# Patient Record
Sex: FEMALE | Race: WHITE
[De-identification: names, ages, dates, MRNs, and addresses within clinical notes are randomized per-mention and may not be internally consistent; named-entity substitution may affect disease eponyms.]

---

## 2017-11-12 ENCOUNTER — HOSPITAL ENCOUNTER (EMERGENCY)
Dept: HOSPITAL 62 - ER | Age: 37
Discharge: HOME | End: 2017-11-12
Payer: SELF-PAY

## 2017-11-12 VITALS — SYSTOLIC BLOOD PRESSURE: 105 MMHG | DIASTOLIC BLOOD PRESSURE: 60 MMHG

## 2017-11-12 DIAGNOSIS — R19.7: ICD-10-CM

## 2017-11-12 DIAGNOSIS — R10.13: ICD-10-CM

## 2017-11-12 DIAGNOSIS — K29.00: Primary | ICD-10-CM

## 2017-11-12 DIAGNOSIS — K21.9: ICD-10-CM

## 2017-11-12 DIAGNOSIS — F17.200: ICD-10-CM

## 2017-11-12 LAB
ALBUMIN SERPL-MCNC: 4.1 G/DL (ref 3.5–5)
ALP SERPL-CCNC: 44 U/L (ref 38–126)
ALT SERPL-CCNC: 26 U/L (ref 9–52)
ANION GAP SERPL CALC-SCNC: 10 MMOL/L (ref 5–19)
APPEARANCE UR: CLEAR
AST SERPL-CCNC: 19 U/L (ref 14–36)
BASOPHILS # BLD AUTO: 0.1 10^3/UL (ref 0–0.2)
BASOPHILS NFR BLD AUTO: 0.7 % (ref 0–2)
BILIRUB DIRECT SERPL-MCNC: 0.3 MG/DL (ref 0–0.4)
BILIRUB SERPL-MCNC: 0.3 MG/DL (ref 0.2–1.3)
BILIRUB UR QL STRIP: NEGATIVE
BUN SERPL-MCNC: 10 MG/DL (ref 7–20)
CALCIUM: 9.4 MG/DL (ref 8.4–10.2)
CHLORIDE SERPL-SCNC: 108 MMOL/L (ref 98–107)
CK MB SERPL-MCNC: 0.57 NG/ML (ref ?–4.55)
CK SERPL-CCNC: 88 U/L (ref 30–135)
CO2 SERPL-SCNC: 24 MMOL/L (ref 22–30)
CREAT SERPL-MCNC: 0.85 MG/DL (ref 0.52–1.25)
EOSINOPHIL # BLD AUTO: 0.2 10^3/UL (ref 0–0.6)
EOSINOPHIL NFR BLD AUTO: 2.6 % (ref 0–6)
ERYTHROCYTE [DISTWIDTH] IN BLOOD BY AUTOMATED COUNT: 12.9 % (ref 11.5–14)
GLUCOSE SERPL-MCNC: 110 MG/DL (ref 75–110)
GLUCOSE UR STRIP-MCNC: NEGATIVE MG/DL
HCT VFR BLD CALC: 39.6 % (ref 36–47)
HGB BLD-MCNC: 13.2 G/DL (ref 12–15.5)
HGB HCT DIFFERENCE: 0
KETONES UR STRIP-MCNC: NEGATIVE MG/DL
LYMPHOCYTES # BLD AUTO: 3 10^3/UL (ref 0.5–4.7)
LYMPHOCYTES NFR BLD AUTO: 39.8 % (ref 13–45)
MCH RBC QN AUTO: 28.1 PG (ref 27–33.4)
MCHC RBC AUTO-ENTMCNC: 33.4 G/DL (ref 32–36)
MCV RBC AUTO: 84 FL (ref 80–97)
MONOCYTES # BLD AUTO: 0.4 10^3/UL (ref 0.1–1.4)
MONOCYTES NFR BLD AUTO: 5.1 % (ref 3–13)
NEUTROPHILS # BLD AUTO: 3.9 10^3/UL (ref 1.7–8.2)
NEUTS SEG NFR BLD AUTO: 51.8 % (ref 42–78)
NITRITE UR QL STRIP: NEGATIVE
PH UR STRIP: 5 [PH] (ref 5–9)
POTASSIUM SERPL-SCNC: 4.5 MMOL/L (ref 3.6–5)
PROT SERPL-MCNC: 6.7 G/DL (ref 6.3–8.2)
PROT UR STRIP-MCNC: NEGATIVE MG/DL
RBC # BLD AUTO: 4.71 10^6/UL (ref 3.72–5.28)
SODIUM SERPL-SCNC: 141.7 MMOL/L (ref 137–145)
SP GR UR STRIP: 1.01
TROPONIN I SERPL-MCNC: < 0.012 NG/ML
UROBILINOGEN UR-MCNC: NEGATIVE MG/DL (ref ?–2)
WBC # BLD AUTO: 7.5 10^3/UL (ref 4–10.5)

## 2017-11-12 PROCEDURE — 82550 ASSAY OF CK (CPK): CPT

## 2017-11-12 PROCEDURE — 80053 COMPREHEN METABOLIC PANEL: CPT

## 2017-11-12 PROCEDURE — 93005 ELECTROCARDIOGRAM TRACING: CPT

## 2017-11-12 PROCEDURE — 93010 ELECTROCARDIOGRAM REPORT: CPT

## 2017-11-12 PROCEDURE — 71010: CPT

## 2017-11-12 PROCEDURE — 84484 ASSAY OF TROPONIN QUANT: CPT

## 2017-11-12 PROCEDURE — 96374 THER/PROPH/DIAG INJ IV PUSH: CPT

## 2017-11-12 PROCEDURE — 99283 EMERGENCY DEPT VISIT LOW MDM: CPT

## 2017-11-12 PROCEDURE — 85025 COMPLETE CBC W/AUTO DIFF WBC: CPT

## 2017-11-12 PROCEDURE — 82553 CREATINE MB FRACTION: CPT

## 2017-11-12 PROCEDURE — 81025 URINE PREGNANCY TEST: CPT

## 2017-11-12 PROCEDURE — 36415 COLL VENOUS BLD VENIPUNCTURE: CPT

## 2017-11-12 PROCEDURE — S0028 INJECTION, FAMOTIDINE, 20 MG: HCPCS

## 2017-11-12 PROCEDURE — 81001 URINALYSIS AUTO W/SCOPE: CPT

## 2017-11-12 NOTE — RADIOLOGY REPORT (SQ)
EXAM DESCRIPTION:  CHEST SINGLE VIEW



COMPLETED DATE/TIME:  11/12/2017 7:47 pm



REASON FOR STUDY:  epigastric pain



COMPARISON:  2011.



NUMBER OF VIEWS:  One view.



TECHNIQUE:  Single frontal radiographic view of the chest acquired.



LIMITATIONS:  None.



FINDINGS:  LUNGS AND PLEURA: No opacities, masses or pneumothorax. No pleural effusion.

MEDIASTINUM AND HILAR STRUCTURES: No masses.  Contour normal.

HEART AND VASCULAR STRUCTURES: Heart normal in size.  Normal vasculature.

BONES: No acute findings.

HARDWARE: None in the chest.

OTHER: No other significant finding.



IMPRESSION:  NO SIGNIFICANT RADIOGRAPHIC FINDING IN THE CHEST.



TECHNICAL DOCUMENTATION:  JOB ID:  [id number]

 2011 [employer]- All Rights Reserved

## 2017-11-12 NOTE — ER DOCUMENT REPORT
ED General





- General


Chief Complaint: Loose Stools


Stated Complaint: HEARTBURN


Time Seen by Provider: 11/12/17 19:10


TRAVEL OUTSIDE OF THE [place] IN LAST 30 DAYS: No





- HPI


Notes: 





Patient is a 37-year-old female with a history of acid reflux who presents the 

ED complaining of GERD symptoms over the last 30 days, loose stool, and 

epigastric pain over the last 2 days.  Patient states that she has been using 

over-the-counter meds with minimal relief.  Patient states that food intake 

worsens her symptoms and laying supine worsens her symptoms.  Patient is able 

to ambulate without any worsening symptoms or development of dyspnea on 

exertion.  Patient states that she has not been evaluated by gastroenterology 

nor has she had an endoscopy performed in the past.  Patient has not noticed 

any melena, hematochezia.  Patient states that otherwise she is still eating 

and drinking and urinating normally.  Patient does admit to smoking but denies 

any other IV drug use.  Patient is currently taking birth control.  She denies 

any long distance travel, leg pains, hemoptysis, or other significant medical 

history.  Pt denies any cardiac family history.  She denies any history of MI, 

PE, DVT, CA, or DM.  Denies any headache, fever, neck pain, URI, sore throat, 

chest pain, palpitations, syncope, cough, shortness of breath, wheeze, dyspnea, 

nausea/vomiting/diarrhea, urinary retention, dysuria, hematuria, back pain, 

loss of control of bowel or bladder, numbness/tingling, muscle paralysis/

weakness, or rash.  Denies drug allergies.





- Related Data


Allergies/Adverse Reactions: 


 





No Known Allergies Allergy (Verified 11/12/17 18:21)


 











Past Medical History





- Social History


Smoking Status: Current Every Day Smoker


Family History: Reviewed & Not Pertinent


Patient has suicidal ideation: No


Patient has homicidal ideation: No





- Past Medical History


Cardiac Medical History: 


   Denies: Hx Coronary Artery Disease, Hx Heart Attack, Hx Hypertension


Pulmonary Medical History: 


   Denies: Hx Asthma, Hx Bronchitis, Hx COPD, Hx Pneumonia


Neurological Medical History: Denies: Hx Cerebrovascular Accident, Hx Seizures


Renal/ Medical History: Reports: Hx Ovarian Cysts.  Denies: Hx Peritoneal 

Dialysis


Musculoskeltal Medical History: Denies Hx Arthritis





- Immunizations


Hx Diphtheria, Pertussis, Tetanus Vaccination: Yes





Review of Systems





- Review of Systems


Notes: 





REVIEW OF SYSTEMS:


CONSTITUTIONAL :  Denies fever,  chills, or sweats.  Denies recent illness.


EENT:   Denies eye, ear, throat, or mouth pain or symptoms.  Denies nasal or 

sinus congestion or discharge.  Denies throat, tongue, or mouth swelling or 

difficulty swallowing.


CARDIOVASCULAR:  Denies chest pain.  Denies palpitations or racing or irregular 

heart beat.  Denies ankle edema.


RESPIRATORY:  Denies cough, cold, or chest congestion.  Denies shortness of 

breath, difficulty breathing, or wheezing.


GASTROINTESTINAL: see hpi.  


GENITOURINARY:  Denies difficulty urinating, painful urination, burning, 

frequency, blood in urine, or discharge.


MUSCULOSKELETAL:  Denies back or neck pain or stiffness.  Denies joint pain or 

swelling.


SKIN:   Denies rash, lesions or sores.


NEUROLOGICAL:  Denies confusion or altered mental status.  Denies passing out 

or loss of consciousness.  Denies dizziness or lightheadedness.  Denies 

headache.  Denies weakness or paralysis or loss of use of either side.  Denies 

problems with gait or speech.  Denies sensory loss, numbness, or tingling.  





ALL OTHER SYSTEMS REVIEWED AND NEGATIVE.





Dictation was performed using Dragon voice recognition software





Physical Exam





- Vital signs


Vitals: 


 











Temp Pulse Resp BP Pulse Ox


 


 98.0 F   65   14   103/61   99 


 


 11/12/17 18:21  11/12/17 18:21  11/12/17 18:21  11/12/17 18:21  11/12/17 18:21











Notes: 





PHYSICAL EXAMINATION:





GENERAL: Well-appearing, well-nourished and in no acute distress.  A&Ox4





HEAD: Atraumatic, normocephalic.





EYES: Pupils equal round and reactive to light, extraocular movements intact, 

sclera anicteric, conjunctiva are normal.





ENT: Nares patent and without discharge.  oropharynx clear without exudates.  

No tonsilar hypertrophy or erythema.  Moist mucous membranes.  No sinus 

tenderness.





NECK: Normal range of motion, supple without lymphadenopathy.  No rigidity/

meningismus.





LUNGS: Breath sounds clear to auscultation bilaterally and equal.  No wheezes 

rales or rhonchi.





HEART: Regular rate and rhythm without murmurs, rubs, gallops.





ABDOMEN: Soft, nondistended abdomen.  No guarding, no rebound.  No masses 

appreciated.  Normal bowel sounds present.  No CVA tenderness bilaterally.  + 

epigastric tenderness (tenderness elicited corresponds to pain described).  





Musculoskeletal: FROM to passive/active. Strength 5+/5. 





Extremities:  No cyanosis, clubbing, or edema b/l.  Peripheral pulses 2+.  

Capillary refill less than 3 seconds.





NEUROLOGICAL: Cranial nerves grossly intact.  Normal speech, normal gait.  

Normal sensory, motor exams 





PSYCH: Normal mood, normal affect.





SKIN: Warm, Dry, normal turgor, no rashes or lesions noted.





Course





- Re-evaluation


Re-evalutation: 





11/12/17 21:37


Patient is an afebrile, well-hydrated, 37-year-old female who presents the ED 

with symptoms of acid reflux versus gastritis.  Vitals are stable.  PE is 

otherwise unremarkable at this time.  Well's Score of 0. Heart Score of 1.  EKG 

was unremarkable for any acute pathology.  CBC was also unremarkable for any 

acute pathology.  CMP unremarkable along with cardiac enzymes.  HCG neg. UA 

negative.  GI cocktail was given today along with IV Pepcid 20 mg.  Pt had 

complete resolution of symptoms soon after drinking the GI cocktail.  Low 

suspicion for any ACS, PE, pneumothorax, pericarditis, dissection, sepsis, 

meningitis, acute abdomen, or other systemic emergent condition at this time.  

Patient is aware that her condition can change from initial presentation and 

she needs to monitor symptoms closely and seek medical attention with any acute 

changes.  With her low risk factors and strong suspicion for GI etiology we 

will be able to discharge in stable condition.  I will send her home with a 

prescription for omeprazole and Carafate to take as directed.  Conservative 

measures for symptoms otherwise.  Advised recheck with PCM this week.  Call 

gastroenterology to schedule an appointment for further evaluation and 

management as well.  Return to the ED with any worsening/concerning symptoms 

otherwise as reviewed in discharge.  Patient is in agreement.








- Vital Signs


Vital signs: 


 











Temp Pulse Resp BP Pulse Ox


 


 98.0 F   65   14   103/61   99 


 


 11/12/17 18:21  11/12/17 18:21  11/12/17 18:21  11/12/17 18:21  11/12/17 18:21














- Laboratory


Result Diagrams: 


 11/12/17 20:00





 11/12/17 20:00


Laboratory results interpreted by me: 


 











  11/12/17





  20:00


 


Chloride  108 H














Discharge





- Discharge


Clinical Impression: 


GERD (gastroesophageal reflux disease)


Qualifiers:


 Esophagitis presence: esophagitis presence not specified Qualified Code(s): 

K21.9 - Gastro-esophageal reflux disease without esophagitis





Gastritis


Qualifiers:


 Gastritis type: unspecified gastritis Chronicity: acute Gastritis bleeding: 

without bleeding Qualified Code(s): K29.00 - Acute gastritis without bleeding





Condition: Stable


Disposition: HOME, SELF-CARE


Instructions:  Gastritis ([facility]), Prilosec (Acid Pump Inhibitor) ([facility]), Reflux 

Disease (GERD) ([facility])


Additional Instructions: 


Maintain adequate fluid and food intake


[place] diet ([employer] Bananas, rice, apples, toast, etc if any nausea/vomiting


Take omeprazole and carafate as directed


Monitor for any worsening symptoms


Recheck with your PCM in 3-5 days


Call Gastroenterology tomorrow to schedule an appointment for further 

evaluation and management


Return to the ED with any worsening symptoms and/or development of fever, 

headache, chest pain, palpitations, syncope, shortness of breath, trouble 

breathing, abdominal pain, n/v/d, blood in stool/urine, weakness, or other 

worsening symptoms that are concerning to you.  


Prescriptions: 


Omeprazole 20 mg PO DAILY #30 tablet.


Sucralfate [Carafate] 1 gm PO QID PRN #420 ml


 PRN Reason: 


Referrals: 


SHREYAS RODAS MD [ACTIVE STAFF] - Follow up in 3-5 days

## 2018-03-23 ENCOUNTER — HOSPITAL ENCOUNTER (EMERGENCY)
Dept: HOSPITAL 62 - ER | Age: 38
Discharge: HOME | End: 2018-03-23
Payer: SELF-PAY

## 2018-03-23 VITALS — SYSTOLIC BLOOD PRESSURE: 120 MMHG | DIASTOLIC BLOOD PRESSURE: 64 MMHG

## 2018-03-23 DIAGNOSIS — O20.0: Primary | ICD-10-CM

## 2018-03-23 DIAGNOSIS — Z3A.00: ICD-10-CM

## 2018-03-23 LAB
ADD MANUAL DIFF: NO
ALBUMIN SERPL-MCNC: 4.6 G/DL (ref 3.5–5)
ALP SERPL-CCNC: 55 U/L (ref 38–126)
ALT SERPL-CCNC: 34 U/L (ref 9–52)
ANION GAP SERPL CALC-SCNC: 9 MMOL/L (ref 5–19)
AST SERPL-CCNC: 21 U/L (ref 14–36)
BASOPHILS # BLD AUTO: 0.1 10^3/UL (ref 0–0.2)
BASOPHILS NFR BLD AUTO: 0.7 % (ref 0–2)
BILIRUB DIRECT SERPL-MCNC: 0.3 MG/DL (ref 0–0.4)
BILIRUB SERPL-MCNC: 0.3 MG/DL (ref 0.2–1.3)
BUN SERPL-MCNC: 11 MG/DL (ref 7–20)
CALCIUM: 10 MG/DL (ref 8.4–10.2)
CHLORIDE SERPL-SCNC: 110 MMOL/L (ref 98–107)
CO2 SERPL-SCNC: 23 MMOL/L (ref 22–30)
EOSINOPHIL # BLD AUTO: 0.2 10^3/UL (ref 0–0.6)
EOSINOPHIL NFR BLD AUTO: 2 % (ref 0–6)
ERYTHROCYTE [DISTWIDTH] IN BLOOD BY AUTOMATED COUNT: 13.5 % (ref 11.5–14)
GLUCOSE SERPL-MCNC: 83 MG/DL (ref 75–110)
HCT VFR BLD CALC: 39.7 % (ref 36–47)
HGB BLD-MCNC: 13.2 G/DL (ref 12–15.5)
LYMPHOCYTES # BLD AUTO: 3.1 10^3/UL (ref 0.5–4.7)
LYMPHOCYTES NFR BLD AUTO: 33.4 % (ref 13–45)
MCH RBC QN AUTO: 27.9 PG (ref 27–33.4)
MCHC RBC AUTO-ENTMCNC: 33.3 G/DL (ref 32–36)
MCV RBC AUTO: 84 FL (ref 80–97)
MONOCYTES # BLD AUTO: 0.4 10^3/UL (ref 0.1–1.4)
MONOCYTES NFR BLD AUTO: 4.1 % (ref 3–13)
NEUTROPHILS # BLD AUTO: 5.6 10^3/UL (ref 1.7–8.2)
NEUTS SEG NFR BLD AUTO: 59.8 % (ref 42–78)
PLATELET # BLD: 203 10^3/UL (ref 150–450)
POTASSIUM SERPL-SCNC: 3.8 MMOL/L (ref 3.6–5)
PROT SERPL-MCNC: 7.5 G/DL (ref 6.3–8.2)
RBC # BLD AUTO: 4.73 10^6/UL (ref 3.72–5.28)
SODIUM SERPL-SCNC: 141.5 MMOL/L (ref 137–145)
TOTAL CELLS COUNTED % (AUTO): 100 %
WBC # BLD AUTO: 9.3 10^3/UL (ref 4–10.5)

## 2018-03-23 PROCEDURE — 76817 TRANSVAGINAL US OBSTETRIC: CPT

## 2018-03-23 PROCEDURE — 84702 CHORIONIC GONADOTROPIN TEST: CPT

## 2018-03-23 PROCEDURE — 81025 URINE PREGNANCY TEST: CPT

## 2018-03-23 PROCEDURE — 85025 COMPLETE CBC W/AUTO DIFF WBC: CPT

## 2018-03-23 PROCEDURE — 80053 COMPREHEN METABOLIC PANEL: CPT

## 2018-03-23 PROCEDURE — 99284 EMERGENCY DEPT VISIT MOD MDM: CPT

## 2018-03-23 PROCEDURE — 36415 COLL VENOUS BLD VENIPUNCTURE: CPT

## 2018-03-23 NOTE — ER DOCUMENT REPORT
ED General





- General


Chief Complaint: Vag Bleeding, +preg <12wks


Stated Complaint: VAGINAL BLEEDING


Time Seen by Provider: 03/23/18 15:03


Mode of Arrival: Ambulatory


Information source: Patient


TRAVEL OUTSIDE OF THE [place] IN LAST 30 DAYS: No





- Related Data


Allergies/Adverse Reactions: 


 





No Known Allergies Allergy (Verified 11/12/17 18:21)


 











Past Medical History





- Social History


Smoking Status: Unknown if Ever Smoked


Family History: Reviewed & Not Pertinent


Patient has suicidal ideation: No


Patient has homicidal ideation: No





- Past Medical History


Cardiac Medical History: 


   Denies: Hx Coronary Artery Disease, Hx Heart Attack, Hx Hypertension


Pulmonary Medical History: 


   Denies: Hx Asthma, Hx Bronchitis, Hx COPD, Hx Pneumonia


Neurological Medical History: Denies: Hx Cerebrovascular Accident, Hx Seizures


Renal/ Medical History: Reports: Hx Ovarian Cysts.  Denies: Hx Peritoneal 

Dialysis


Musculoskeltal Medical History: Denies Hx Arthritis





- Immunizations


Hx Diphtheria, Pertussis, Tetanus Vaccination: Yes





Physical Exam





- Vital signs


Vitals: 





 











Temp Pulse Resp BP Pulse Ox


 


 97.8 F   79   20   120/67   99 


 


 03/23/18 14:11  03/23/18 14:11  03/23/18 14:11  03/23/18 14:11  03/23/18 14:11














Course





- Vital Signs


Vital signs: 





 











Temp Pulse Resp BP Pulse Ox


 


 97.8 F   79   20   120/67   99 


 


 03/23/18 14:11  03/23/18 14:11  03/23/18 14:11  03/23/18 14:11  03/23/18 14:11














- Laboratory


Result Diagrams: 


 03/23/18 15:22





 03/23/18 15:22


Laboratory results interpreted by me: 





 











  03/23/18 03/23/18





  15:13 15:22


 


Chloride   110 H


 


Beta HCG, Quant   117.24 H


 


Urine HCG, Qual  POSITIVE H 














Discharge





- Discharge


Clinical Impression: 


 Threatened miscarriage in early pregnancy





Condition: Stable


Disposition: HOME, SELF-CARE


Instructions:  Threatened Miscarriage ([facility])


Additional Instructions: 


Please follow-up in 48 hours for reevaluation of your blood work return 

immediately if there are any other concerns


Forms:  Follow-Up Laboratory Testing

## 2018-03-23 NOTE — RADIOLOGY REPORT (SQ)
EXAM DESCRIPTION:  U/S OB TRANSVAGINAL W/O DOP



COMPLETED DATE/TIME:  3/23/2018 5:31 pm



REASON FOR STUDY:  + preg, vag bleed



COMPARISON:  None.



TECHNIQUE:  Transvaginal static and realtime grayscale images acquired of the pelvis. Additional lucrecia
cted spectral and color Doppler images recorded. All images stored on PACs.

bHC



LIMITATIONS:  None.



FINDINGS:  No identifiable intrauterine gestational sac is seen.

UTERUS: No masses or anomalies.  7 x 4.4 x 3.4 cm.

CERVICAL LENGTH: 3.2 cm.   Closed.

RIGHT ADNEXA: 2 x 2.1 x 3.4 cm.  There is a complex area in the right ovary measuring 1.6 x 1.3 x 1.3
 cm.

No adnexal free fluid.

No adnexal masses.

LEFT ADNEXA: Normal ovary.  2.7 x 2 x 1.9 cm.

No adnexal free fluid.

No adnexal masses.

FREE FLUID: None.

OTHER: No other significant finding.



IMPRESSION:  1.  There is no identifiable intrauterine gestation at this time.

2.  There is small complex area in the right ovary possibly representing a collapsed cyst.

3.  Follow-up as clinically indicated.



TECHNICAL DOCUMENTATION:  JOB ID:  [id number]

  [employer]- All Rights Reserved



Reading location - IP/workstation name: [email or web address]

## 2018-03-25 ENCOUNTER — HOSPITAL ENCOUNTER (OUTPATIENT)
Dept: HOSPITAL 62 - LAB | Age: 38
End: 2018-03-25
Attending: EMERGENCY MEDICINE
Payer: SELF-PAY

## 2018-03-25 DIAGNOSIS — O20.0: Primary | ICD-10-CM

## 2018-03-25 PROCEDURE — 84702 CHORIONIC GONADOTROPIN TEST: CPT

## 2018-03-25 PROCEDURE — 36415 COLL VENOUS BLD VENIPUNCTURE: CPT

## 2018-04-05 ENCOUNTER — HOSPITAL ENCOUNTER (OUTPATIENT)
Dept: HOSPITAL 62 - OD | Age: 38
End: 2018-04-05
Attending: OBSTETRICS & GYNECOLOGY
Payer: SELF-PAY

## 2018-04-05 DIAGNOSIS — O00.90: Primary | ICD-10-CM

## 2018-04-05 LAB
ADD MANUAL DIFF: NO
ALBUMIN SERPL-MCNC: 4.6 G/DL (ref 3.5–5)
ALP SERPL-CCNC: 57 U/L (ref 38–126)
ALT SERPL-CCNC: 30 U/L (ref 9–52)
ANION GAP SERPL CALC-SCNC: 9 MMOL/L (ref 5–19)
AST SERPL-CCNC: 24 U/L (ref 14–36)
BASOPHILS # BLD AUTO: 0.1 10^3/UL (ref 0–0.2)
BASOPHILS NFR BLD AUTO: 0.8 % (ref 0–2)
BILIRUB DIRECT SERPL-MCNC: 0.3 MG/DL (ref 0–0.4)
BILIRUB SERPL-MCNC: 0.4 MG/DL (ref 0.2–1.3)
BUN SERPL-MCNC: 8 MG/DL (ref 7–20)
CALCIUM: 10.2 MG/DL (ref 8.4–10.2)
CHLORIDE SERPL-SCNC: 106 MMOL/L (ref 98–107)
CO2 SERPL-SCNC: 26 MMOL/L (ref 22–30)
EOSINOPHIL # BLD AUTO: 0.2 10^3/UL (ref 0–0.6)
EOSINOPHIL NFR BLD AUTO: 2.4 % (ref 0–6)
ERYTHROCYTE [DISTWIDTH] IN BLOOD BY AUTOMATED COUNT: 13.4 % (ref 11.5–14)
GLUCOSE SERPL-MCNC: 84 MG/DL (ref 75–110)
HCT VFR BLD CALC: 38 % (ref 36–47)
HGB BLD-MCNC: 12.6 G/DL (ref 12–15.5)
LYMPHOCYTES # BLD AUTO: 3.2 10^3/UL (ref 0.5–4.7)
LYMPHOCYTES NFR BLD AUTO: 48.6 % (ref 13–45)
MCH RBC QN AUTO: 27.8 PG (ref 27–33.4)
MCHC RBC AUTO-ENTMCNC: 33.3 G/DL (ref 32–36)
MCV RBC AUTO: 84 FL (ref 80–97)
MONOCYTES # BLD AUTO: 0.3 10^3/UL (ref 0.1–1.4)
MONOCYTES NFR BLD AUTO: 4.6 % (ref 3–13)
NEUTROPHILS # BLD AUTO: 2.9 10^3/UL (ref 1.7–8.2)
NEUTS SEG NFR BLD AUTO: 43.6 % (ref 42–78)
PLATELET # BLD: 239 10^3/UL (ref 150–450)
POTASSIUM SERPL-SCNC: 4.2 MMOL/L (ref 3.6–5)
PROT SERPL-MCNC: 7.6 G/DL (ref 6.3–8.2)
RBC # BLD AUTO: 4.54 10^6/UL (ref 3.72–5.28)
SODIUM SERPL-SCNC: 141.2 MMOL/L (ref 137–145)
TOTAL CELLS COUNTED % (AUTO): 100 %
WBC # BLD AUTO: 6.6 10^3/UL (ref 4–10.5)

## 2018-04-05 PROCEDURE — 80053 COMPREHEN METABOLIC PANEL: CPT

## 2018-04-05 PROCEDURE — 85025 COMPLETE CBC W/AUTO DIFF WBC: CPT

## 2018-04-05 PROCEDURE — 36415 COLL VENOUS BLD VENIPUNCTURE: CPT

## 2018-04-05 PROCEDURE — 84702 CHORIONIC GONADOTROPIN TEST: CPT

## 2019-02-04 ENCOUNTER — HOSPITAL ENCOUNTER (OUTPATIENT)
Dept: HOSPITAL 62 - END | Age: 39
Discharge: HOME | End: 2019-02-04
Attending: INTERNAL MEDICINE
Payer: COMMERCIAL

## 2019-02-04 VITALS — SYSTOLIC BLOOD PRESSURE: 107 MMHG | DIASTOLIC BLOOD PRESSURE: 67 MMHG

## 2019-02-04 DIAGNOSIS — K21.0: Primary | ICD-10-CM

## 2019-02-04 DIAGNOSIS — F17.210: ICD-10-CM

## 2019-02-04 DIAGNOSIS — K44.9: ICD-10-CM

## 2019-02-04 DIAGNOSIS — K29.80: ICD-10-CM

## 2019-02-04 DIAGNOSIS — K29.50: ICD-10-CM

## 2019-02-04 PROCEDURE — 88305 TISSUE EXAM BY PATHOLOGIST: CPT

## 2019-02-04 PROCEDURE — 88342 IMHCHEM/IMCYTCHM 1ST ANTB: CPT

## 2019-02-04 PROCEDURE — 43239 EGD BIOPSY SINGLE/MULTIPLE: CPT

## 2019-02-04 NOTE — OPERATIVE REPORT
Operative Report


DATE OF SURGERY: 02/04/19


Operative Report: 





The risks benefits and alternatives of the procedure explained to the patient in

detail and informed consent is obtained.A GIF Olympus video scope was inserted 

into the patient's mouth and hypopharynx, the esophagus is identified intubated 

and insufflated, the scope was then advanced through the esophagus stomach and 

duodenum, retroflexion maneuver is done, the esophagus stomach and first and 

second portions of the duodenum examined.


PREOPERATIVE DIAGNOSIS: Gastroesophageal reflux disease


POSTOPERATIVE DIAGNOSIS: Mullen's esophagus status post biopsy to rule out 

dysplasia.  Esophagitis.  Hiatal hernia.  Gastritis status post biopsy rule out 

Helicobacter pylori.  Duodenitis


OPERATION: EGD with biopsy


SURGEON: SHREYAS RODAS


ANESTHESIA: LMAC


TISSUE REMOVED OR ALTERED: As noted above.


COMPLICATIONS: 





None.


ESTIMATED BLOOD LOSS: None.


INTRAOPERATIVE FINDINGS: As noted above.


PROCEDURE: 





Patient tolerated the procedure well.


No immediate postprocedure complications are noted.


Patient discharged in good condition.


Discharge date 2/4/2019.


Discharge diet: Regular.


Discharge activity: Regular.


2-3-week follow-up to discuss findings.


Patient is instructed to call the office or proceed to the emergency room should

there be any further problems or questions.


I will wait to pathology.

## 2019-02-12 ENCOUNTER — HOSPITAL ENCOUNTER (OUTPATIENT)
Dept: HOSPITAL 62 - RAD | Age: 39
End: 2019-02-12
Attending: INTERNAL MEDICINE
Payer: COMMERCIAL

## 2019-02-12 DIAGNOSIS — R10.11: ICD-10-CM

## 2019-02-12 DIAGNOSIS — R11.0: Primary | ICD-10-CM

## 2019-02-12 PROCEDURE — 78226 HEPATOBILIARY SYSTEM IMAGING: CPT

## 2019-02-12 PROCEDURE — A9537 TC99M MEBROFENIN: HCPCS

## 2019-02-12 PROCEDURE — 76705 ECHO EXAM OF ABDOMEN: CPT

## 2019-02-12 NOTE — RADIOLOGY REPORT (SQ)
EXAM DESCRIPTION:  NM HIDA SCAN



COMPLETED DATE/TIME:  2/12/2019 1:37 pm



REASON FOR STUDY:  NAUSEA (R11.0), RUQ PAIN (R10.11)



COMPARISON:  None.



RADIONUCLIDE AND DOSE:  5.7 mCi technetium 99 mebrofenin.  Intravenous.



ADDITIONAL DRUGS AND DOSES:  None.



TECHNIQUE:  Dynamic images obtained over the gallbladder fossa.  Equipment malfunction prohibited pro
per completion of the study.



LIMITATIONS:  None.



FINDINGS:  Gallbladder activity.



IMPRESSION:  Nondiagnostic.



TECHNICAL DOCUMENTATION:  JOB ID:  [id number]

 2011 [employer]- All Rights Reserved



Reading location - IP/workstation name: [email or web address]

## 2019-02-12 NOTE — RADIOLOGY REPORT (SQ)
EXAM DESCRIPTION:  U/S ABDOMEN LIMITED W/O DOP



COMPLETED DATE/TIME:  2/12/2019 7:58 am



REASON FOR STUDY:  NAUSEA (R11.0), RUQ PAIN (R10.11) R11.0  NAUSEA R10.11  RIGHT UPPER QUADRANT PAIN



COMPARISON:  None.



TECHNIQUE:  Dynamic and static grayscale images acquired of the abdomen and recorded on PACS. Additio
nal selected color Doppler and spectral images recorded.



LIMITATIONS:  None.



FINDINGS:  PANCREAS:  The head and body of the pancreas are of normal echogenicity.  The tail is subo
ptimally visualized due to overlying bowel gas.

LIVER:  The liver measures 14.9 cm in length, normal size.  No masses. Echotexture normal.

LIVER VASCULATURE: Normal directional flow of the main portal vein and hepatic veins.

GALLBLADDER: No stones.  The gallbladder wall measures 1.9 mm, normal wall thickness. No pericholecys
tic fluid.

ULTRASOUND-DETECTED BRYAN'S SIGN: Negative.

INTRAHEPATIC DUCTS AND COMMON DUCT: CBD measures 3.1 mm in diameter, normal.  The intrahepatic ducts 
normal caliber. No filling defects.

INFERIOR VENA CAVA: Normal flow.

AORTA: No aneurysm.

RIGHT KIDNEY:  The right kidney measures 10.8 cm in length, normal size.  Normal echogenicity. No sol
id or suspicious masses. No hydronephrosis. No calcifications.

PERITONEAL AND RIGHT PLEURAL SPACE: No ascites or effusions.

OTHER: No other significant findings.



IMPRESSION:  1.  The tail of the pancreas is suboptimally visualized due to overlying bowel gas.

2.  Examination is otherwise unremarkable.



TECHNICAL DOCUMENTATION:  JOB ID:  [id number]

 2011 [employer]- All Rights Reserved



Reading location - IP/workstation name: [email or web address]

## 2019-02-19 ENCOUNTER — HOSPITAL ENCOUNTER (OUTPATIENT)
Dept: HOSPITAL 62 - OROUT | Age: 39
Discharge: HOME | End: 2019-02-19
Attending: INTERNAL MEDICINE
Payer: COMMERCIAL

## 2019-02-19 VITALS — SYSTOLIC BLOOD PRESSURE: 135 MMHG | DIASTOLIC BLOOD PRESSURE: 85 MMHG

## 2019-02-19 DIAGNOSIS — K22.719: Primary | ICD-10-CM

## 2019-02-19 DIAGNOSIS — F17.210: ICD-10-CM

## 2019-02-19 DIAGNOSIS — K64.8: ICD-10-CM

## 2019-02-19 DIAGNOSIS — K60.2: ICD-10-CM

## 2019-02-19 DIAGNOSIS — K52.9: ICD-10-CM

## 2019-02-19 DIAGNOSIS — Z79.899: ICD-10-CM

## 2019-02-19 PROCEDURE — 88305 TISSUE EXAM BY PATHOLOGIST: CPT

## 2019-02-19 PROCEDURE — 81025 URINE PREGNANCY TEST: CPT

## 2019-02-19 PROCEDURE — 45380 COLONOSCOPY AND BIOPSY: CPT

## 2019-02-19 PROCEDURE — 43270 EGD LESION ABLATION: CPT

## 2019-02-19 NOTE — OPERATIVE REPORT
Operative Report


DATE OF SURGERY: 02/19/19


Operative Report: 





The risks, benefits and alternatives of the procedure including the risk of 

bleeding, perforation requiring surgery have been explained to the patient in 

detail and informed consent has been obtained.  The patient is placed in a left,

lateral decubital position.  Timeout was called.  Propofol medication is 

administered.  Rectal examination is done which did not reveal any masses, tears

or fissures.  An Olympus videoscope was introduced into the patient's rectum.  

The scope was then carefully advanced all the way to the cecum.  The cecum was 

identified by the usual anatomical landmarks including the ileocecal valve as 

well as the appendiceal office.  Photodocumentation was obtained.  The scope was

then sequentially pulled back via the various segments of the colon including 

the ascending colon, hepatic flexure, transverse colon, splenic flexure, de

scending colon and finally into the rectosigmoid portions of the colon.  

Retroflexion maneuver was performed.


The risks benefits and alternatives of the procedure explained to the patient in

detail and informed consent is obtained.A GIF Olympus video scope was inserted 

into the patient's mouth and hypopharynx ,the esophagus is identified intubated 

and insufflated ,the scope was then advanced through the esophagus stomach and 

duodenum ,retroflexion maneuver is done, the esophagus stomach and first and 

second portions of the duodenum examined.


PREOPERATIVE DIAGNOSIS: Rectal pain.  History of Mullen's esophagus with 

unknown dysplasia


POSTOPERATIVE DIAGNOSIS: Inflammation noted on the right side of the colon 

status post biopsy.  Anal fissure noted.  Internal hemorrhoids.  Mullen's 

esophagus radiofrequency ablation


OPERATION: Colonoscopy with biopsy.  EGD with ablation


SURGEON: SHREYAS RODAS


ANESTHESIA: LMAC


TISSUE REMOVED OR ALTERED: As noted above.


COMPLICATIONS: 





None.


ESTIMATED BLOOD LOSS: None.


INTRAOPERATIVE FINDINGS: As noted above.


PROCEDURE: 





Patient tolerated the procedure well.


No immediate postprocedure complications are noted.


Patient discharged in good condition.


Discharge date 2/19/2019.


Discharge diet: Regular.


Discharge activity: Regular.


2-3-week follow-up to discuss findings.


Patient is instructed call the office or proceed to the emergency room should 

there be any further proximal questions.


Wait on the pathology.

## 2023-09-24 ENCOUNTER — WEB ENCOUNTER (OUTPATIENT)
Dept: URBAN - METROPOLITAN AREA CLINIC 54 | Facility: CLINIC | Age: 43
End: 2023-09-24

## 2023-09-25 ENCOUNTER — LAB OUTSIDE AN ENCOUNTER (OUTPATIENT)
Dept: URBAN - METROPOLITAN AREA CLINIC 54 | Facility: CLINIC | Age: 43
End: 2023-09-25

## 2023-09-25 ENCOUNTER — OFFICE VISIT (OUTPATIENT)
Dept: URBAN - METROPOLITAN AREA CLINIC 54 | Facility: CLINIC | Age: 43
End: 2023-09-25
Payer: COMMERCIAL

## 2023-09-25 ENCOUNTER — WEB ENCOUNTER (OUTPATIENT)
Dept: URBAN - METROPOLITAN AREA CLINIC 54 | Facility: CLINIC | Age: 43
End: 2023-09-25

## 2023-09-25 VITALS
SYSTOLIC BLOOD PRESSURE: 121 MMHG | BODY MASS INDEX: 29.55 KG/M2 | DIASTOLIC BLOOD PRESSURE: 79 MMHG | TEMPERATURE: 98.2 F | HEART RATE: 99 BPM | WEIGHT: 166.8 LBS | HEIGHT: 63 IN

## 2023-09-25 DIAGNOSIS — K62.5 RECTAL BLEEDING: ICD-10-CM

## 2023-09-25 DIAGNOSIS — R14.0 BLOATING: ICD-10-CM

## 2023-09-25 DIAGNOSIS — K59.09 CHANGE IN BOWEL MOVEMENTS INTERMITTENT CONSTIPATION. URGENCY IN THE MORNING.: ICD-10-CM

## 2023-09-25 DIAGNOSIS — R12 HEARTBURN: ICD-10-CM

## 2023-09-25 DIAGNOSIS — B20 HIV INFECTION, UNSPECIFIED SYMPTOM STATUS: ICD-10-CM

## 2023-09-25 DIAGNOSIS — R11.0 NAUSEA: ICD-10-CM

## 2023-09-25 PROBLEM — 86406008: Status: ACTIVE | Noted: 2023-09-25

## 2023-09-25 PROBLEM — 14760008: Status: ACTIVE | Noted: 2023-09-25

## 2023-09-25 PROCEDURE — 99204 OFFICE O/P NEW MOD 45 MIN: CPT | Performed by: INTERNAL MEDICINE

## 2023-09-25 RX ORDER — ABACAVIR SULFATE, DOLUTEGRAVIR SODIUM, LAMIVUDINE 600; 50; 300 MG/1; MG/1; MG/1
1 TABLET TABLET, FILM COATED ORAL ONCE A DAY
Status: ACTIVE | COMMUNITY

## 2023-09-25 RX ORDER — SOD SULF/POT CHLORIDE/MAG SULF 1.479 G
AS DIRECTED TABLET ORAL
Qty: 1 | Refills: 0 | OUTPATIENT
Start: 2023-09-25 | End: 2023-09-26

## 2023-09-25 NOTE — HPI-TODAY'S VISIT:
Patient is a 44 yo woman self referred for above reasons. She c/o various GI complaints including abdominal bloating, abdominal fullness, rectal bleeding, constipation, loose bowels, nausea, 30 lb wt. gain, heartbur for several months. She has a remote diagnosis of PUD diagnosed via EGD. She has not tried OTC meds. She has HIV on HAART under control (undetectable for 12 years). She has tried gluten restriction with good results. She is non-smoker and denies cannabis use. She has never had a colonsocopy. The bleeding is bright red and consistent. She denies fever, chills but has night sweats.

## 2023-09-26 LAB
IMMUNOGLOBULIN A: 171
INTERPRETATION: (no result)
TISSUE TRANSGLUTAMINASE AB, IGA: <1

## 2023-10-20 LAB
CALPROTECTIN, FECAL: 44
PANCREATIC ELASTASE, FECAL: >500

## 2023-10-31 ENCOUNTER — CLAIMS CREATED FROM THE CLAIM WINDOW (OUTPATIENT)
Dept: URBAN - METROPOLITAN AREA SURGERY CENTER 14 | Facility: SURGERY CENTER | Age: 43
End: 2023-10-31
Payer: COMMERCIAL

## 2023-10-31 ENCOUNTER — CLAIMS CREATED FROM THE CLAIM WINDOW (OUTPATIENT)
Dept: URBAN - METROPOLITAN AREA CLINIC 4 | Facility: CLINIC | Age: 43
End: 2023-10-31
Payer: COMMERCIAL

## 2023-10-31 DIAGNOSIS — K63.89 OTHER SPECIFIED DISEASES OF INTESTINE: ICD-10-CM

## 2023-10-31 DIAGNOSIS — R12 HEARTBURN: ICD-10-CM

## 2023-10-31 DIAGNOSIS — K92.1 HEMATOCHEZIA: ICD-10-CM

## 2023-10-31 DIAGNOSIS — K31.89 OTHER DISEASES OF STOMACH AND DUODENUM: ICD-10-CM

## 2023-10-31 DIAGNOSIS — K63.3 ULCER OF INTESTINE: ICD-10-CM

## 2023-10-31 DIAGNOSIS — K21.00 GASTRO-ESOPHAGEAL REFLUX DISEASE WITH ESOPHAGITIS, WITHOUT BLEEDING: ICD-10-CM

## 2023-10-31 DIAGNOSIS — K64.8 OTHER HEMORRHOIDS: ICD-10-CM

## 2023-10-31 DIAGNOSIS — K92.1 MELENA: ICD-10-CM

## 2023-10-31 DIAGNOSIS — K31.89 MUCOSAL ABNORMALITY OF STOMACH: ICD-10-CM

## 2023-10-31 DIAGNOSIS — K20.80 ESOPHAGITIS, LOS ANGELES GRADE A: ICD-10-CM

## 2023-10-31 PROCEDURE — 43239 EGD BIOPSY SINGLE/MULTIPLE: CPT | Performed by: INTERNAL MEDICINE

## 2023-10-31 PROCEDURE — G8907 PT DOC NO EVENTS ON DISCHARG: HCPCS | Performed by: INTERNAL MEDICINE

## 2023-10-31 PROCEDURE — 88305 TISSUE EXAM BY PATHOLOGIST: CPT | Performed by: PATHOLOGY

## 2023-10-31 PROCEDURE — 88312 SPECIAL STAINS GROUP 1: CPT | Performed by: PATHOLOGY

## 2023-10-31 PROCEDURE — 00813 ANES UPR LWR GI NDSC PX: CPT | Performed by: NURSE ANESTHETIST, CERTIFIED REGISTERED

## 2023-10-31 PROCEDURE — 45380 COLONOSCOPY AND BIOPSY: CPT | Performed by: INTERNAL MEDICINE

## 2023-10-31 RX ORDER — ABACAVIR SULFATE, DOLUTEGRAVIR SODIUM, LAMIVUDINE 600; 50; 300 MG/1; MG/1; MG/1
1 TABLET TABLET, FILM COATED ORAL ONCE A DAY
Status: ACTIVE | COMMUNITY

## 2023-11-01 ENCOUNTER — TELEPHONE ENCOUNTER (OUTPATIENT)
Dept: URBAN - METROPOLITAN AREA CLINIC 54 | Facility: CLINIC | Age: 43
End: 2023-11-01

## 2023-11-01 RX ORDER — OMEPRAZOLE 40 MG/1
1 CAPSULE 30 MINUTES BEFORE MORNING MEAL CAPSULE, DELAYED RELEASE ORAL ONCE A DAY
Qty: 30 | Refills: 1 | OUTPATIENT
Start: 2023-11-02

## 2023-11-20 ENCOUNTER — OFFICE VISIT (OUTPATIENT)
Dept: URBAN - METROPOLITAN AREA CLINIC 54 | Facility: CLINIC | Age: 43
End: 2023-11-20
Payer: COMMERCIAL

## 2023-11-20 VITALS
HEART RATE: 67 BPM | HEIGHT: 63 IN | WEIGHT: 166.8 LBS | TEMPERATURE: 97.2 F | SYSTOLIC BLOOD PRESSURE: 115 MMHG | BODY MASS INDEX: 29.55 KG/M2 | DIASTOLIC BLOOD PRESSURE: 73 MMHG

## 2023-11-20 DIAGNOSIS — R14.0 BLOATING: ICD-10-CM

## 2023-11-20 DIAGNOSIS — R11.0 NAUSEA: ICD-10-CM

## 2023-11-20 DIAGNOSIS — R19.4 CHANGE IN BOWEL HABITS: ICD-10-CM

## 2023-11-20 DIAGNOSIS — K62.5 RECTAL BLEEDING: ICD-10-CM

## 2023-11-20 PROBLEM — 266433003: Status: ACTIVE | Noted: 2023-11-20

## 2023-11-20 PROCEDURE — 99213 OFFICE O/P EST LOW 20 MIN: CPT

## 2023-11-20 RX ORDER — ABACAVIR SULFATE, DOLUTEGRAVIR SODIUM, LAMIVUDINE 600; 50; 300 MG/1; MG/1; MG/1
1 TABLET TABLET, FILM COATED ORAL ONCE A DAY
Status: ACTIVE | COMMUNITY

## 2023-11-20 RX ORDER — OMEPRAZOLE 40 MG/1
1 CAPSULE 30 MINUTES BEFORE MORNING MEAL CAPSULE, DELAYED RELEASE ORAL ONCE A DAY
Qty: 30 | Refills: 1 | Status: ON HOLD | COMMUNITY
Start: 2023-11-02

## 2023-11-20 NOTE — HPI-TODAY'S VISIT:
9/25/23: Patient is a 42 yo woman self referred for above reasons. She c/o various GI complaints including abdominal bloating, abdominal fullness, rectal bleeding, constipation, loose bowels, nausea, 30 lb wt. gain, heartburn for several months. She has a remote diagnosis of PUD diagnosed via EGD. She has not tried OTC meds. She has HIV on HAART under control (undetectable for 12 years). She has tried gluten restriction with good results. She is non-smoker and denies cannabis use. She has never had a colonsocopy. The bleeding is bright red and consistent. She denies fever, chills but has night sweats.  11/20/23: Pt presents for follow up after EGD/cscope. Workup showed negative celiac serologies, normal FC and PE. EGD revealed LA Grade A esophagitis and cscope showed focal nonspecific colitis, likely form prep. No IBD. Symptoms are overall unchanged. Primary complaint is early satiety, nausea, and reflux. Not having any abd pain, just general discomfort. Has small BMs every 2-3 days with straining. States she continues to gain weight despite eating less maintaining activity level. Says she was evaluated for thyroid disease and DM. Pt admits she felt really good in the week after doing the bowel prep but then symptoms returned. Has not started omeprazole. Not taking anything for constipation.  EGD 10/31/23: - LA Grade A reflux esophagitis with no bleeding. Biopsied. - Z-line regular, 40 cm from the incisors. - Erythematous mucosa in the stomach. Biopsied. - Normal examined duodenum. Biopsied. Biopsy: GEJ - gastric type mucosa without significant abnormality; no squamous mucosa identified. No significant abnormality on gastric or duodenal bx.  Colonoscopy 10/31/23: - Localized mild inflammation was found in the transverse colon secondary to colitis (? ischemic vs medication). Biopsied. - Internal hemorrhoids. Biopsy: Focal healing mucosal injury. Negative for Infectious Organisms, Dysplasia or Malignancy. (No IBD)

## 2023-11-21 ENCOUNTER — OFFICE VISIT (OUTPATIENT)
Dept: URBAN - METROPOLITAN AREA CLINIC 54 | Facility: CLINIC | Age: 43
End: 2023-11-21

## 2024-01-22 ENCOUNTER — OFFICE VISIT (OUTPATIENT)
Dept: URBAN - METROPOLITAN AREA CLINIC 54 | Facility: CLINIC | Age: 44
End: 2024-01-22

## 2024-02-19 ENCOUNTER — OV EP (OUTPATIENT)
Dept: URBAN - METROPOLITAN AREA CLINIC 54 | Facility: CLINIC | Age: 44
End: 2024-02-19
Payer: COMMERCIAL

## 2024-02-19 VITALS — BODY MASS INDEX: 29.23 KG/M2 | HEIGHT: 63 IN | WEIGHT: 165 LBS

## 2024-02-19 DIAGNOSIS — K59.01 CONSTIPATION: ICD-10-CM

## 2024-02-19 DIAGNOSIS — K21.00 ALKALINE REFLUX ESOPHAGITIS: ICD-10-CM

## 2024-02-19 DIAGNOSIS — K30 FUNCTIONAL DYSPEPSIA: ICD-10-CM

## 2024-02-19 DIAGNOSIS — K62.5 RECTAL BLEEDING: ICD-10-CM

## 2024-02-19 PROBLEM — 3696007: Status: ACTIVE | Noted: 2024-02-19

## 2024-02-19 PROCEDURE — 99214 OFFICE O/P EST MOD 30 MIN: CPT

## 2024-02-19 RX ORDER — ABACAVIR SULFATE, DOLUTEGRAVIR SODIUM, LAMIVUDINE 600; 50; 300 MG/1; MG/1; MG/1
1 TABLET TABLET, FILM COATED ORAL ONCE A DAY
Status: ACTIVE | COMMUNITY

## 2024-02-19 RX ORDER — OMEPRAZOLE 40 MG/1
1 CAPSULE 30 MINUTES BEFORE MORNING MEAL CAPSULE, DELAYED RELEASE ORAL ONCE A DAY
Qty: 30 | Refills: 1 | Status: ON HOLD | COMMUNITY
Start: 2023-11-02

## 2024-02-19 RX ORDER — OMEPRAZOLE 40 MG/1
1 CAPSULE 30 MINUTES BEFORE MORNING MEAL CAPSULE, DELAYED RELEASE ORAL ONCE A DAY
Qty: 90 | Refills: 1
Start: 2023-11-02

## 2024-02-19 NOTE — PHYSICAL EXAM CHEST:
Year Removed: 1900 no lesions, no deformities, no traumatic injuries, breathing is unlabored without accessory muscle use

## 2024-06-17 ENCOUNTER — OFFICE VISIT (OUTPATIENT)
Dept: URBAN - METROPOLITAN AREA CLINIC 54 | Facility: CLINIC | Age: 44
End: 2024-06-17

## 2024-06-25 ENCOUNTER — OFFICE VISIT (OUTPATIENT)
Dept: URBAN - METROPOLITAN AREA CLINIC 54 | Facility: CLINIC | Age: 44
End: 2024-06-25

## 2024-07-12 ENCOUNTER — DASHBOARD ENCOUNTERS (OUTPATIENT)
Age: 44
End: 2024-07-12

## 2024-07-15 ENCOUNTER — WEB ENCOUNTER (OUTPATIENT)
Dept: URBAN - METROPOLITAN AREA CLINIC 54 | Facility: CLINIC | Age: 44
End: 2024-07-15

## 2024-07-18 ENCOUNTER — OFFICE VISIT (OUTPATIENT)
Dept: URBAN - METROPOLITAN AREA CLINIC 54 | Facility: CLINIC | Age: 44
End: 2024-07-18

## 2024-07-18 RX ORDER — ABACAVIR SULFATE, DOLUTEGRAVIR SODIUM, LAMIVUDINE 600; 50; 300 MG/1; MG/1; MG/1
1 TABLET TABLET, FILM COATED ORAL ONCE A DAY
Status: ACTIVE | COMMUNITY

## 2024-07-18 RX ORDER — OMEPRAZOLE 40 MG/1
1 CAPSULE 30 MINUTES BEFORE MORNING MEAL CAPSULE, DELAYED RELEASE ORAL ONCE A DAY
Qty: 90 | Refills: 1 | Status: ACTIVE | COMMUNITY
Start: 2023-11-02

## 2024-09-20 ENCOUNTER — OFFICE VISIT (OUTPATIENT)
Dept: URBAN - METROPOLITAN AREA CLINIC 54 | Facility: CLINIC | Age: 44
End: 2024-09-20